# Patient Record
Sex: FEMALE | Race: WHITE | NOT HISPANIC OR LATINO | Employment: FULL TIME | URBAN - METROPOLITAN AREA
[De-identification: names, ages, dates, MRNs, and addresses within clinical notes are randomized per-mention and may not be internally consistent; named-entity substitution may affect disease eponyms.]

---

## 2018-09-25 ENCOUNTER — OFFICE VISIT (OUTPATIENT)
Dept: INTERNAL MEDICINE | Facility: CLINIC | Age: 28
End: 2018-09-25

## 2018-09-25 VITALS
DIASTOLIC BLOOD PRESSURE: 58 MMHG | RESPIRATION RATE: 16 BRPM | TEMPERATURE: 98.3 F | OXYGEN SATURATION: 98 % | WEIGHT: 137 LBS | HEART RATE: 58 BPM | HEIGHT: 67 IN | SYSTOLIC BLOOD PRESSURE: 114 MMHG | BODY MASS INDEX: 21.5 KG/M2

## 2018-09-25 DIAGNOSIS — J30.9 ALLERGIC RHINITIS, UNSPECIFIED CHRONICITY, UNSPECIFIED SEASONALITY, UNSPECIFIED TRIGGER: Primary | ICD-10-CM

## 2018-09-25 DIAGNOSIS — Z23 FLU VACCINE NEED: ICD-10-CM

## 2018-09-25 PROCEDURE — 90471 IMMUNIZATION ADMIN: CPT | Performed by: NURSE PRACTITIONER

## 2018-09-25 PROCEDURE — 99203 OFFICE O/P NEW LOW 30 MIN: CPT | Performed by: NURSE PRACTITIONER

## 2018-09-25 PROCEDURE — 90674 CCIIV4 VAC NO PRSV 0.5 ML IM: CPT | Performed by: NURSE PRACTITIONER

## 2018-09-25 RX ORDER — DESOGESTREL AND ETHINYL ESTRADIOL 0.15-0.03
1 KIT ORAL DAILY
COMMUNITY
End: 2021-08-03

## 2018-09-25 RX ORDER — FLUTICASONE PROPIONATE 50 MCG
2 SPRAY, SUSPENSION (ML) NASAL DAILY
Qty: 1 BOTTLE | Refills: 5 | Status: SHIPPED | OUTPATIENT
Start: 2018-09-25 | End: 2019-11-25 | Stop reason: SDUPTHER

## 2018-09-25 RX ORDER — LEVOCETIRIZINE DIHYDROCHLORIDE 5 MG/1
5 TABLET, FILM COATED ORAL EVERY EVENING
Qty: 30 TABLET | Refills: 5 | Status: SHIPPED | OUTPATIENT
Start: 2018-09-25 | End: 2019-03-28 | Stop reason: SDUPTHER

## 2018-09-25 NOTE — PROGRESS NOTES
Subjective   Danny Arita is a 28 y.o. female    Chief Complaint   Patient presents with   • Establish Care   • Allergies     nasal congestion, sneezing, watery eyes. This week feels much better. Using OTC Nasal spray and claritin     History of Present Illness     New pt here to establish care.      Allergies - pt states that for the past few weeks she has been battling congestion, sneezing, itchy watery eyes.  She has tried multiple OTC antihistamines and Afrin, along with her BF's singulair.  Sx's are waxing and waning.  Feeling better today than she has in a week.  No fever or chills.  Does not feel ill.    Past Medical History:   Diagnosis Date   • History of Papanicolaou smear of cervix 04/2018    GYN- Midwife at      History reviewed. No pertinent surgical history.    No Known Allergies    Family History   Problem Relation Age of Onset   • No Known Problems Mother    • No Known Problems Father    • Dementia Paternal Grandmother    • Cancer Paternal Grandfather    • No Known Problems Sister    • No Known Problems Brother    • No Known Problems Maternal Grandmother    • No Known Problems Maternal Grandfather    • Hashimoto's thyroiditis Sister      Social History     Social History   • Marital status: Significant Other     Spouse name: N/A   • Number of children: N/A   • Years of education: N/A     Occupational History   • Not on file.     Social History Main Topics   • Smoking status: Never Smoker   • Smokeless tobacco: Never Used   • Alcohol use Yes      Comment: 1-2 drinks a couple times per week   • Drug use: No   • Sexual activity: Yes     Partners: Male     Birth control/ protection: OCP      Comment: engaged     Other Topics Concern   • Not on file     Social History Narrative   • No narrative on file         The following portions of the patient's history were reviewed and updated as appropriate: allergies, current medications, past family history, past medical history, past social history, past  surgical history and problem list.    Current Outpatient Prescriptions:   •  desogestrel-ethinyl estradiol (APRI) 0.15-30 MG-MCG per tablet, Take 1 tablet by mouth Daily., Disp: , Rfl:   •  fluticasone (FLONASE) 50 MCG/ACT nasal spray, 2 sprays into the nostril(s) as directed by provider Daily., Disp: 1 bottle, Rfl: 5  •  levocetirizine (XYZAL) 5 MG tablet, Take 1 tablet by mouth Every Evening., Disp: 30 tablet, Rfl: 5     Review of Systems   Constitutional: Negative for chills, fatigue and fever.   HENT: Positive for congestion, postnasal drip, rhinorrhea and sneezing.    Eyes: Positive for itching.   Respiratory: Negative for cough, chest tightness and shortness of breath.    Cardiovascular: Negative for chest pain.   Gastrointestinal: Negative for abdominal pain, diarrhea, nausea and vomiting.   Endocrine: Negative for cold intolerance and heat intolerance.   Musculoskeletal: Negative for arthralgias.   Neurological: Negative for dizziness.       Objective   Physical Exam   Constitutional: She is oriented to person, place, and time. She appears well-developed and well-nourished.   HENT:   Head: Normocephalic and atraumatic.   Right Ear: A middle ear effusion is present.   Left Ear: A middle ear effusion is present.   Nose: Mucosal edema and rhinorrhea present. Right sinus exhibits no maxillary sinus tenderness and no frontal sinus tenderness. Left sinus exhibits no maxillary sinus tenderness and no frontal sinus tenderness.   Mouth/Throat: No posterior oropharyngeal erythema.   Eyes: Pupils are equal, round, and reactive to light. Conjunctivae and EOM are normal.   Neck: Normal range of motion.   Cardiovascular: Normal rate, regular rhythm and normal heart sounds.    Pulmonary/Chest: Effort normal and breath sounds normal.   Abdominal: Soft. Bowel sounds are normal.   Musculoskeletal: Normal range of motion.   Neurological: She is alert and oriented to person, place, and time. She has normal reflexes.   Skin:  "Skin is warm and dry.   Psychiatric: She has a normal mood and affect. Her behavior is normal. Judgment and thought content normal.     Vitals:    09/25/18 1500   BP: 114/58   Pulse: 58   Resp: 16   Temp: 98.3 °F (36.8 °C)   TempSrc: Temporal Artery    SpO2: 98%   Weight: 62.1 kg (137 lb)   Height: 169 cm (66.54\")         Assessment/Plan   Danny was seen today for establish care and allergies.    Diagnoses and all orders for this visit:    Allergic rhinitis, unspecified chronicity, unspecified seasonality, unspecified trigger  -     fluticasone (FLONASE) 50 MCG/ACT nasal spray; 2 sprays into the nostril(s) as directed by provider Daily.  -     levocetirizine (XYZAL) 5 MG tablet; Take 1 tablet by mouth Every Evening.    Flu vaccine need  -     Flucelvax Quad=>4Years (1231-0508)      Meds as directed  Flu shot updated  May need to add singulair if sx's are not improving  Return in about 6 weeks (around 11/6/2018) for Annual.             "

## 2019-03-28 DIAGNOSIS — J30.9 ALLERGIC RHINITIS: ICD-10-CM

## 2019-03-28 RX ORDER — LEVOCETIRIZINE DIHYDROCHLORIDE 5 MG/1
TABLET, FILM COATED ORAL
Qty: 30 TABLET | Refills: 2 | Status: SHIPPED | OUTPATIENT
Start: 2019-03-28 | End: 2019-04-25 | Stop reason: SDUPTHER

## 2019-04-25 DIAGNOSIS — J30.9 ALLERGIC RHINITIS: ICD-10-CM

## 2019-04-25 RX ORDER — LEVOCETIRIZINE DIHYDROCHLORIDE 5 MG/1
TABLET, FILM COATED ORAL
Qty: 30 TABLET | Refills: 2 | Status: SHIPPED | OUTPATIENT
Start: 2019-04-25 | End: 2019-11-25 | Stop reason: SDUPTHER

## 2019-11-25 ENCOUNTER — OFFICE VISIT (OUTPATIENT)
Dept: INTERNAL MEDICINE | Facility: CLINIC | Age: 29
End: 2019-11-25

## 2019-11-25 VITALS
HEART RATE: 64 BPM | RESPIRATION RATE: 16 BRPM | SYSTOLIC BLOOD PRESSURE: 104 MMHG | HEIGHT: 67 IN | WEIGHT: 137.6 LBS | DIASTOLIC BLOOD PRESSURE: 60 MMHG | OXYGEN SATURATION: 99 % | BODY MASS INDEX: 21.6 KG/M2 | TEMPERATURE: 98.4 F

## 2019-11-25 DIAGNOSIS — J30.9 ALLERGIC RHINITIS, UNSPECIFIED SEASONALITY, UNSPECIFIED TRIGGER: ICD-10-CM

## 2019-11-25 DIAGNOSIS — Z23 NEED FOR INFLUENZA VACCINATION: ICD-10-CM

## 2019-11-25 DIAGNOSIS — J30.9 ALLERGIC RHINITIS: ICD-10-CM

## 2019-11-25 DIAGNOSIS — R55 SYNCOPE, UNSPECIFIED SYNCOPE TYPE: Primary | ICD-10-CM

## 2019-11-25 DIAGNOSIS — I49.8 SINUS ARRHYTHMIA: ICD-10-CM

## 2019-11-25 PROCEDURE — 99214 OFFICE O/P EST MOD 30 MIN: CPT | Performed by: NURSE PRACTITIONER

## 2019-11-25 PROCEDURE — 85025 COMPLETE CBC W/AUTO DIFF WBC: CPT | Performed by: NURSE PRACTITIONER

## 2019-11-25 PROCEDURE — 90471 IMMUNIZATION ADMIN: CPT | Performed by: NURSE PRACTITIONER

## 2019-11-25 PROCEDURE — 90686 IIV4 VACC NO PRSV 0.5 ML IM: CPT | Performed by: NURSE PRACTITIONER

## 2019-11-25 PROCEDURE — 80053 COMPREHEN METABOLIC PANEL: CPT | Performed by: NURSE PRACTITIONER

## 2019-11-25 PROCEDURE — 84443 ASSAY THYROID STIM HORMONE: CPT | Performed by: NURSE PRACTITIONER

## 2019-11-25 RX ORDER — LEVOCETIRIZINE DIHYDROCHLORIDE 5 MG/1
5 TABLET, FILM COATED ORAL EVERY EVENING
Qty: 30 TABLET | Refills: 5 | Status: SHIPPED | OUTPATIENT
Start: 2019-11-25 | End: 2020-05-18

## 2019-11-25 RX ORDER — FLUTICASONE PROPIONATE 50 MCG
2 SPRAY, SUSPENSION (ML) NASAL DAILY
Qty: 1 BOTTLE | Refills: 5 | Status: SHIPPED | OUTPATIENT
Start: 2019-11-25 | End: 2020-05-18

## 2019-11-25 NOTE — PROGRESS NOTES
Subjective   Danny NORMAN is a 29 y.o. female    Chief Complaint   Patient presents with   • Follow-up   • Sleeping Problem     just falls a sleep very easily or either passing out. This has been an issue more frequently but has been something she has delt with for awhile.    • Flu Vaccine   • Allergic Rhinitis     refill on xyzal and flonase.      History of Present Illness     Syncope -patient states that she has had problems with passing out for many years now.  States that symptoms are becoming more frequent.  States that most recently she was at a bar, but had not had a drink yet when she was standing in a large group of people and fainted.  This happened earlier this summer when she was outside at a large family gathering and was very hot.  Both of the last 2 instances have been witnessed.  There have been no convulsions.  No loss of bowel or bladder.  Patient states that she does not feel post ictal after the syncopal episode.  She states that preceding the incident she does feel dizzy and lightheaded.  This is never been worked up.    Allergic rhinitis -Matias and stable on Xyzal 5 mg daily, and Flonase nasal spray.    Flu shot -updating today      The following portions of the patient's history were reviewed and updated as appropriate: allergies, current medications, past family history, past medical history, past social history, past surgical history and problem list.    Current Outpatient Medications:   •  desogestrel-ethinyl estradiol (APRI) 0.15-30 MG-MCG per tablet, Take 1 tablet by mouth Daily., Disp: , Rfl:   •  fluticasone (FLONASE) 50 MCG/ACT nasal spray, 2 sprays into the nostril(s) as directed by provider Daily., Disp: 1 bottle, Rfl: 5  •  levocetirizine (XYZAL) 5 MG tablet, Take 1 tablet by mouth Every Evening., Disp: 30 tablet, Rfl: 5     Review of Systems   Constitutional: Negative for chills, fatigue and fever.   Respiratory: Negative for cough, chest tightness and shortness of breath.   "  Cardiovascular: Negative for chest pain.   Gastrointestinal: Negative for abdominal pain, diarrhea, nausea and vomiting.   Endocrine: Negative for cold intolerance and heat intolerance.   Musculoskeletal: Negative for arthralgias.   Neurological: Positive for dizziness, syncope and light-headedness.       Objective     Physical Exam   Constitutional: She is oriented to person, place, and time. She appears well-developed and well-nourished.   HENT:   Head: Normocephalic and atraumatic.   Eyes: Conjunctivae and EOM are normal. Pupils are equal, round, and reactive to light.   Neck: Normal range of motion.   Cardiovascular: Normal rate, regular rhythm and normal heart sounds.   Pulmonary/Chest: Effort normal and breath sounds normal.   Abdominal: Soft. Bowel sounds are normal.   Musculoskeletal: Normal range of motion.   Neurological: She is alert and oriented to person, place, and time. She has normal strength and normal reflexes. A cranial nerve deficit and sensory deficit is present. She displays a negative Romberg sign. GCS eye subscore is 4. GCS verbal subscore is 5. GCS motor subscore is 6.   Skin: Skin is warm and dry.   Psychiatric: She has a normal mood and affect. Her behavior is normal. Judgment and thought content normal.     Vitals:    11/25/19 1621   BP: 104/60   Pulse: 64   Resp: 16   Temp: 98.4 °F (36.9 °C)   TempSrc: Temporal   SpO2: 99%   Weight: 62.4 kg (137 lb 9.6 oz)   Height: 169 cm (66.54\")     Statics  Lying- 98/60, heart rate 60, O2 sat 100%  Sitting -102/64, heart rate 64, O2 sat 99%  Lying-106/70, heart rate 77, O2 sat 100%      ECG 12 Lead  Date/Time: 11/26/2019 1:02 PM  Performed by: Charlotte Soto APRN  Authorized by: Charlotte Soto APRN   Comparison: not compared with previous ECG   Rhythm: sinus arrhythmia  Rate: normal  ST Segments: ST segments normal  T Waves: T waves normal  QRS axis: normal  Other findings: non-specific ST-T wave changes    Clinical impression: abnormal " EKG            Assessment/Plan   Danny was seen today for follow-up, sleeping problem, flu vaccine and allergic rhinitis.    Diagnoses and all orders for this visit:    Syncope, unspecified syncope type  -     ECG 12 Lead  -     CBC & Differential  -     Comprehensive Metabolic Panel  -     TSH  -     Holter monitor - 48 hour; Future  -     Adult Transthoracic Echo Complete W/ Cont if Necessary Per Protocol; Future  -     CBC Auto Differential    Sinus arrhythmia  -     Holter monitor - 48 hour; Future  -     Adult Transthoracic Echo Complete W/ Cont if Necessary Per Protocol; Future    Allergic rhinitis  -     levocetirizine (XYZAL) 5 MG tablet; Take 1 tablet by mouth Every Evening.    Allergic rhinitis, unspecified seasonality, unspecified trigger  -     fluticasone (FLONASE) 50 MCG/ACT nasal spray; 2 sprays into the nostril(s) as directed by provider Daily.    Need for influenza vaccination  -     Fluarix/Fluzone/Afluria Quad/FluLaval Quad    Orthostatics are within acceptable limits  EKG with sinus arrhythmia  We will check labs  We will set patient up for 48-hour Holter and echocardiogram  I have encouraged patient to increase water intake along with salt intake  Medications refilled  Flu shot updated  Follow-up with me in 4 to 6 weeks or sooner with any problems

## 2019-11-26 LAB
ALBUMIN SERPL-MCNC: 4.4 G/DL (ref 3.5–5.2)
ALBUMIN/GLOB SERPL: 1.6 G/DL
ALP SERPL-CCNC: 50 U/L (ref 39–117)
ALT SERPL W P-5'-P-CCNC: 15 U/L (ref 1–33)
ANION GAP SERPL CALCULATED.3IONS-SCNC: 13.1 MMOL/L (ref 5–15)
AST SERPL-CCNC: 18 U/L (ref 1–32)
BASOPHILS # BLD AUTO: 0.05 10*3/MM3 (ref 0–0.2)
BASOPHILS NFR BLD AUTO: 0.5 % (ref 0–1.5)
BILIRUB SERPL-MCNC: 0.2 MG/DL (ref 0.2–1.2)
BUN BLD-MCNC: 9 MG/DL (ref 6–20)
BUN/CREAT SERPL: 15.8 (ref 7–25)
CALCIUM SPEC-SCNC: 8.9 MG/DL (ref 8.6–10.5)
CHLORIDE SERPL-SCNC: 102 MMOL/L (ref 98–107)
CO2 SERPL-SCNC: 23.9 MMOL/L (ref 22–29)
CREAT BLD-MCNC: 0.57 MG/DL (ref 0.57–1)
DEPRECATED RDW RBC AUTO: 38.4 FL (ref 37–54)
EOSINOPHIL # BLD AUTO: 0.28 10*3/MM3 (ref 0–0.4)
EOSINOPHIL NFR BLD AUTO: 2.6 % (ref 0.3–6.2)
ERYTHROCYTE [DISTWIDTH] IN BLOOD BY AUTOMATED COUNT: 11.8 % (ref 12.3–15.4)
GFR SERPL CREATININE-BSD FRML MDRD: 125 ML/MIN/1.73
GLOBULIN UR ELPH-MCNC: 2.7 GM/DL
GLUCOSE BLD-MCNC: 77 MG/DL (ref 65–99)
HCT VFR BLD AUTO: 38.7 % (ref 34–46.6)
HGB BLD-MCNC: 13.1 G/DL (ref 12–15.9)
IMM GRANULOCYTES # BLD AUTO: 0.02 10*3/MM3 (ref 0–0.05)
IMM GRANULOCYTES NFR BLD AUTO: 0.2 % (ref 0–0.5)
LYMPHOCYTES # BLD AUTO: 3.18 10*3/MM3 (ref 0.7–3.1)
LYMPHOCYTES NFR BLD AUTO: 29.2 % (ref 19.6–45.3)
MCH RBC QN AUTO: 30.5 PG (ref 26.6–33)
MCHC RBC AUTO-ENTMCNC: 33.9 G/DL (ref 31.5–35.7)
MCV RBC AUTO: 90 FL (ref 79–97)
MONOCYTES # BLD AUTO: 0.61 10*3/MM3 (ref 0.1–0.9)
MONOCYTES NFR BLD AUTO: 5.6 % (ref 5–12)
NEUTROPHILS # BLD AUTO: 6.74 10*3/MM3 (ref 1.7–7)
NEUTROPHILS NFR BLD AUTO: 61.9 % (ref 42.7–76)
NRBC BLD AUTO-RTO: 0 /100 WBC (ref 0–0.2)
PLATELET # BLD AUTO: 393 10*3/MM3 (ref 140–450)
PMV BLD AUTO: 10.4 FL (ref 6–12)
POTASSIUM BLD-SCNC: 4.2 MMOL/L (ref 3.5–5.2)
PROT SERPL-MCNC: 7.1 G/DL (ref 6–8.5)
RBC # BLD AUTO: 4.3 10*6/MM3 (ref 3.77–5.28)
SODIUM BLD-SCNC: 139 MMOL/L (ref 136–145)
TSH SERPL DL<=0.05 MIU/L-ACNC: 5.19 UIU/ML (ref 0.27–4.2)
WBC NRBC COR # BLD: 10.88 10*3/MM3 (ref 3.4–10.8)

## 2019-11-26 PROCEDURE — 93000 ELECTROCARDIOGRAM COMPLETE: CPT | Performed by: NURSE PRACTITIONER

## 2019-12-02 DIAGNOSIS — R79.89 ABNORMAL TSH: Primary | ICD-10-CM

## 2019-12-04 ENCOUNTER — TELEPHONE (OUTPATIENT)
Dept: INTERNAL MEDICINE | Facility: CLINIC | Age: 29
End: 2019-12-04

## 2019-12-04 NOTE — TELEPHONE ENCOUNTER
----- Message from BLAINE Rangel sent at 12/2/2019 10:52 AM EST -----  Thyroid lab was slightly elevated.  I would like to check additional labs.  Orders are in the computer.  She can stop by anytime and have these done.  All other labs were within acceptable limits.

## 2019-12-10 ENCOUNTER — TELEPHONE (OUTPATIENT)
Dept: INTERNAL MEDICINE | Facility: CLINIC | Age: 29
End: 2019-12-10

## 2019-12-10 NOTE — TELEPHONE ENCOUNTER
Patient called back regarding her lab results. I read the message and gave her the information. Patient understood and will stop by the office to have additional labs.

## 2019-12-13 ENCOUNTER — LAB (OUTPATIENT)
Dept: INTERNAL MEDICINE | Facility: CLINIC | Age: 29
End: 2019-12-13

## 2019-12-13 ENCOUNTER — HOSPITAL ENCOUNTER (OUTPATIENT)
Dept: CARDIOLOGY | Facility: HOSPITAL | Age: 29
Discharge: HOME | End: 2019-12-13
Admitting: NURSE PRACTITIONER

## 2019-12-13 VITALS — WEIGHT: 137 LBS | HEIGHT: 66 IN | BODY MASS INDEX: 22.02 KG/M2

## 2019-12-13 DIAGNOSIS — R55 SYNCOPE, UNSPECIFIED SYNCOPE TYPE: ICD-10-CM

## 2019-12-13 DIAGNOSIS — I49.8 SINUS ARRHYTHMIA: ICD-10-CM

## 2019-12-13 DIAGNOSIS — R79.89 ABNORMAL TSH: ICD-10-CM

## 2019-12-13 LAB
25(OH)D3 SERPL-MCNC: 30.3 NG/ML (ref 30–100)
BH CV ECHO MEAS - AO MAX PG (FULL): 2.5 MMHG
BH CV ECHO MEAS - AO MAX PG: 6 MMHG
BH CV ECHO MEAS - AO MEAN PG (FULL): 2 MMHG
BH CV ECHO MEAS - AO MEAN PG: 4 MMHG
BH CV ECHO MEAS - AO V2 MAX: 122 CM/SEC
BH CV ECHO MEAS - AO V2 MEAN: 89.1 CM/SEC
BH CV ECHO MEAS - AO V2 VTI: 26.9 CM
BH CV ECHO MEAS - AVA(I,A): 2.5 CM^2
BH CV ECHO MEAS - AVA(I,D): 2.5 CM^2
BH CV ECHO MEAS - AVA(V,A): 2.6 CM^2
BH CV ECHO MEAS - AVA(V,D): 2.6 CM^2
BH CV ECHO MEAS - BSA(HAYCOCK): 1.7 M^2
BH CV ECHO MEAS - BSA: 1.7 M^2
BH CV ECHO MEAS - BZI_BMI: 22.1 KILOGRAMS/M^2
BH CV ECHO MEAS - BZI_METRIC_HEIGHT: 167.6 CM
BH CV ECHO MEAS - BZI_METRIC_WEIGHT: 62.1 KG
BH CV ECHO MEAS - EDV(CUBED): 99.3 ML
BH CV ECHO MEAS - EDV(TEICH): 98.8 ML
BH CV ECHO MEAS - EF(CUBED): 72.5 %
BH CV ECHO MEAS - EF(TEICH): 64.3 %
BH CV ECHO MEAS - ESV(CUBED): 27.3 ML
BH CV ECHO MEAS - ESV(TEICH): 35.3 ML
BH CV ECHO MEAS - FS: 35 %
BH CV ECHO MEAS - IVS/LVPW: 1
BH CV ECHO MEAS - IVSD: 0.79 CM
BH CV ECHO MEAS - LA DIMENSION: 2.5 CM
BH CV ECHO MEAS - LAD MAJOR: 3.6 CM
BH CV ECHO MEAS - LAT PEAK E' VEL: 28.6 CM/SEC
BH CV ECHO MEAS - LATERAL E/E' RATIO: 3.1
BH CV ECHO MEAS - LV MASS(C)D: 116.5 GRAMS
BH CV ECHO MEAS - LV MASS(C)DI: 68.4 GRAMS/M^2
BH CV ECHO MEAS - LV MAX PG: 3.5 MMHG
BH CV ECHO MEAS - LV MEAN PG: 2 MMHG
BH CV ECHO MEAS - LV V1 MAX: 93.2 CM/SEC
BH CV ECHO MEAS - LV V1 MEAN: 64.4 CM/SEC
BH CV ECHO MEAS - LV V1 VTI: 19.8 CM
BH CV ECHO MEAS - LVIDD: 4.6 CM
BH CV ECHO MEAS - LVIDS: 3 CM
BH CV ECHO MEAS - LVOT AREA (M): 3.5 CM^2
BH CV ECHO MEAS - LVOT AREA: 3.5 CM^2
BH CV ECHO MEAS - LVOT DIAM: 2.1 CM
BH CV ECHO MEAS - LVPWD: 0.78 CM
BH CV ECHO MEAS - MED PEAK E' VEL: 15.6 CM/SEC
BH CV ECHO MEAS - MEDIAL E/E' RATIO: 5.7
BH CV ECHO MEAS - MV A MAX VEL: 47.4 CM/SEC
BH CV ECHO MEAS - MV DEC TIME: 0.19 SEC
BH CV ECHO MEAS - MV E MAX VEL: 89.3 CM/SEC
BH CV ECHO MEAS - MV E/A: 1.9
BH CV ECHO MEAS - PA ACC SLOPE: 525 CM/SEC^2
BH CV ECHO MEAS - PA ACC TIME: 0.14 SEC
BH CV ECHO MEAS - PA MAX PG: 3.8 MMHG
BH CV ECHO MEAS - PA PR(ACCEL): 17.4 MMHG
BH CV ECHO MEAS - PA V2 MAX: 97.7 CM/SEC
BH CV ECHO MEAS - SI(CUBED): 42.3 ML/M^2
BH CV ECHO MEAS - SI(LVOT): 40.3 ML/M^2
BH CV ECHO MEAS - SI(TEICH): 37.3 ML/M^2
BH CV ECHO MEAS - SV(CUBED): 72 ML
BH CV ECHO MEAS - SV(LVOT): 68.6 ML
BH CV ECHO MEAS - SV(TEICH): 63.5 ML
BH CV ECHO MEAS - TAPSE (>1.6): 1.6 CM2
BH CV ECHO MEASUREMENTS AVERAGE E/E' RATIO: 4.04
BH CV VAS BP RIGHT ARM: NORMAL MMHG
BH CV XLRA - RV BASE: 2.9 CM
BH CV XLRA - RV LENGTH: 5.4 CM
BH CV XLRA - RV MID: 2.4 CM
BH CV XLRA - TDI S': 12.4 CM/SEC
LEFT ATRIUM VOLUME INDEX: 16.4 ML/M^2
LEFT ATRIUM VOLUME: 28 ML
LV EF 2D ECHO EST: 55 %
T3FREE SERPL-MCNC: 3.87 PG/ML (ref 2–4.4)
T4 FREE SERPL-MCNC: 1.21 NG/DL (ref 0.93–1.7)

## 2019-12-13 PROCEDURE — 84439 ASSAY OF FREE THYROXINE: CPT | Performed by: NURSE PRACTITIONER

## 2019-12-13 PROCEDURE — 82306 VITAMIN D 25 HYDROXY: CPT | Performed by: NURSE PRACTITIONER

## 2019-12-13 PROCEDURE — 84481 FREE ASSAY (FT-3): CPT | Performed by: NURSE PRACTITIONER

## 2019-12-13 PROCEDURE — 93306 TTE W/DOPPLER COMPLETE: CPT

## 2019-12-13 PROCEDURE — 93306 TTE W/DOPPLER COMPLETE: CPT | Performed by: INTERNAL MEDICINE

## 2019-12-17 ENCOUNTER — TELEPHONE (OUTPATIENT)
Dept: INTERNAL MEDICINE | Facility: CLINIC | Age: 29
End: 2019-12-17

## 2019-12-17 NOTE — TELEPHONE ENCOUNTER
Patient has been notified of results and verbalized understanding.     She said that when she went for the ECHO they told her that the insurance did not cover this test and she had to pay apart of full amount and wanted to know if this can be looked into or if it needed to coded a different way. I asked her if what she paid was just based on her deductible and she said that it wasn't a copay or deductible issue but simply the test was not covered. I told her we would look into that for her because normally we should have made sure it was approved before it was done?

## 2019-12-17 NOTE — TELEPHONE ENCOUNTER
----- Message from BLAINE Rangel sent at 12/17/2019  1:06 PM EST -----  Thyroid labs along with vitamin D look great.

## 2019-12-17 NOTE — TELEPHONE ENCOUNTER
----- Message from BLAINE Rangel sent at 12/17/2019  1:05 PM EST -----  Please let patient know that her echo shows a normal heart function.  She does have trace to mild mitral valve and tricuspid valve regurgitation.  This simply means that those valves do not close quite as tight as they should and do allow a mild amount of back flow.  This should not contribute to her symptoms and is nothing to be concerned about.  Nothing to do unless she were to have shortness of breath with swelling in lower extremities and/or chest pain.

## 2019-12-23 ENCOUNTER — TELEPHONE (OUTPATIENT)
Dept: INTERNAL MEDICINE | Facility: CLINIC | Age: 29
End: 2019-12-23

## 2019-12-23 NOTE — TELEPHONE ENCOUNTER
Left msg to call me back. Bozena had called patient on Friday and left ms for her to call back as well. I went ahead and sent patient a mychart msg letting her know that her Holter results were normal.

## 2020-01-06 ENCOUNTER — OFFICE VISIT (OUTPATIENT)
Dept: INTERNAL MEDICINE | Facility: CLINIC | Age: 30
End: 2020-01-06

## 2020-01-06 VITALS
HEART RATE: 51 BPM | HEIGHT: 67 IN | BODY MASS INDEX: 21.41 KG/M2 | WEIGHT: 136.4 LBS | RESPIRATION RATE: 16 BRPM | TEMPERATURE: 98.6 F | SYSTOLIC BLOOD PRESSURE: 98 MMHG | DIASTOLIC BLOOD PRESSURE: 70 MMHG | OXYGEN SATURATION: 99 %

## 2020-01-06 DIAGNOSIS — R55 VASOVAGAL SYNCOPE: Primary | ICD-10-CM

## 2020-01-06 PROCEDURE — 99213 OFFICE O/P EST LOW 20 MIN: CPT | Performed by: NURSE PRACTITIONER

## 2020-01-06 NOTE — PROGRESS NOTES
Subjective   Danny Hernandez is a 29 y.o. female    Chief Complaint   Patient presents with   • 6 week follow up   • Syncope     Had Echo and Holter done.      History of Present Illness     Here for 6 week f/u on syncope    Syncope -patient states that she has had problems with passing out for many years now.  At last visit she stated that she felt her symptoms were becoming more frequent.  States that most recently she was at a bar, but had not had a drink yet when she was standing in a large group of people and fainted.  This happened earlier this summer when she was outside at a large family gathering and was very hot.  Both of the last 2 instances have been witnessed.  There have been no convulsions.  No loss of bowel or bladder.  Patient states that she does not feel post ictal after the syncopal episode.  She states that preceding the incident she does feel dizzy and lightheaded.     At her last visit, we clive labs.  TSH was mildly elevated, but repeat TSH and T4 were WNL.  She has also worn a holter and had an echo - both WNL.  She has been increasing her hydration and salt intake since her last visit.  She has not had any further episodes.      The following portions of the patient's history were reviewed and updated as appropriate: allergies, current medications, past family history, past medical history, past social history, past surgical history and problem list.    Current Outpatient Medications:   •  desogestrel-ethinyl estradiol (APRI) 0.15-30 MG-MCG per tablet, Take 1 tablet by mouth Daily., Disp: , Rfl:   •  fluticasone (FLONASE) 50 MCG/ACT nasal spray, 2 sprays into the nostril(s) as directed by provider Daily., Disp: 1 bottle, Rfl: 5  •  levocetirizine (XYZAL) 5 MG tablet, Take 1 tablet by mouth Every Evening., Disp: 30 tablet, Rfl: 5     Review of Systems   Constitutional: Negative for chills, fatigue and fever.   Respiratory: Negative for cough, chest tightness and shortness of breath.   "  Cardiovascular: Negative for chest pain.   Gastrointestinal: Negative for abdominal pain, diarrhea, nausea and vomiting.   Endocrine: Negative for cold intolerance and heat intolerance.   Musculoskeletal: Negative for arthralgias.   Neurological: Negative for dizziness.       Objective   Physical Exam   Constitutional: She is oriented to person, place, and time. She appears well-developed and well-nourished.   HENT:   Head: Normocephalic and atraumatic.   Eyes: Pupils are equal, round, and reactive to light. Conjunctivae and EOM are normal.   Neck: Normal range of motion.   Cardiovascular: Normal rate, regular rhythm and normal heart sounds.   Pulmonary/Chest: Effort normal and breath sounds normal.   Abdominal: Soft. Bowel sounds are normal.   Musculoskeletal: Normal range of motion.   Neurological: She is alert and oriented to person, place, and time. She has normal reflexes.   Skin: Skin is warm and dry.   Psychiatric: She has a normal mood and affect. Her behavior is normal. Judgment and thought content normal.     Vitals:    01/06/20 1623   BP: 98/70   Pulse: 51   Resp: 16   Temp: 98.6 °F (37 °C)   TempSrc: Temporal   SpO2: 99%   Weight: 61.9 kg (136 lb 6.4 oz)   Height: 169 cm (66.54\")         Assessment/Plan   Danny was seen today for 6 week follow up and syncope.    Diagnoses and all orders for this visit:    Vasovagal syncope    I suggested the next step in the work up would be a tilt table, but she wishes to hold off on this for now  She will continue with increased hydration and salt intake  RTC if sx's worsen or do not improve             "

## 2020-02-24 ENCOUNTER — OFFICE VISIT (OUTPATIENT)
Dept: INTERNAL MEDICINE | Facility: CLINIC | Age: 30
End: 2020-02-24

## 2020-02-24 VITALS
RESPIRATION RATE: 16 BRPM | OXYGEN SATURATION: 98 % | DIASTOLIC BLOOD PRESSURE: 62 MMHG | SYSTOLIC BLOOD PRESSURE: 100 MMHG | HEART RATE: 89 BPM | TEMPERATURE: 98.6 F | BODY MASS INDEX: 21.06 KG/M2 | HEIGHT: 67 IN | WEIGHT: 134.2 LBS

## 2020-02-24 DIAGNOSIS — J10.1 INFLUENZA A: Primary | ICD-10-CM

## 2020-02-24 LAB
EXPIRATION DATE: ABNORMAL
FLUAV AG NPH QL: POSITIVE
FLUBV AG NPH QL: NEGATIVE
INTERNAL CONTROL: ABNORMAL
Lab: ABNORMAL

## 2020-02-24 PROCEDURE — 87804 INFLUENZA ASSAY W/OPTIC: CPT | Performed by: NURSE PRACTITIONER

## 2020-02-24 PROCEDURE — 99213 OFFICE O/P EST LOW 20 MIN: CPT | Performed by: NURSE PRACTITIONER

## 2020-02-24 RX ORDER — BROMPHENIRAMINE MALEATE, PSEUDOEPHEDRINE HYDROCHLORIDE, AND DEXTROMETHORPHAN HYDROBROMIDE 2; 30; 10 MG/5ML; MG/5ML; MG/5ML
10 SYRUP ORAL 3 TIMES DAILY PRN
Qty: 300 ML | Refills: 0 | Status: SHIPPED | OUTPATIENT
Start: 2020-02-24 | End: 2021-08-03

## 2020-02-24 RX ORDER — OSELTAMIVIR PHOSPHATE 75 MG/1
75 CAPSULE ORAL 2 TIMES DAILY
Qty: 10 CAPSULE | Refills: 0 | Status: SHIPPED | OUTPATIENT
Start: 2020-02-24 | End: 2020-02-29

## 2020-02-24 NOTE — PROGRESS NOTES
Subjective   Danny Hernandez is a 29 y.o. female    Chief Complaint   Patient presents with   • URI     sx's started about 2 weeks. Nasal congestion, Productive cough, drainage, runny nose, sneezing, neck and upper back pain     History of Present Illness     Pt states that she began with URI sx's approximately 2 weeks ago.  Sx's were waxing and waning until she began to feel acutely ill/worse 2 days ago.  Since Saturday she has been extremely tired with body aches, cough, congestion and upper back pain.  She was recently exposed to the flu and her sx's are similar to what she experienced the last time she had the flu.  She denies fever, but has felt chilled.      The following portions of the patient's history were reviewed and updated as appropriate: allergies, current medications, past family history, past medical history, past social history, past surgical history and problem list.    Current Outpatient Medications:   •  brompheniramine-pseudoephedrine-DM 30-2-10 MG/5ML syrup, Take 10 mL by mouth 3 (Three) Times a Day As Needed for Congestion or Cough., Disp: 300 mL, Rfl: 0  •  desogestrel-ethinyl estradiol (APRI) 0.15-30 MG-MCG per tablet, Take 1 tablet by mouth Daily., Disp: , Rfl:   •  fluticasone (FLONASE) 50 MCG/ACT nasal spray, 2 sprays into the nostril(s) as directed by provider Daily., Disp: 1 bottle, Rfl: 5  •  levocetirizine (XYZAL) 5 MG tablet, Take 1 tablet by mouth Every Evening., Disp: 30 tablet, Rfl: 5  •  oseltamivir (TAMIFLU) 75 MG capsule, Take 1 capsule by mouth 2 (Two) Times a Day for 5 days., Disp: 10 capsule, Rfl: 0     Review of Systems   Constitutional: Positive for chills and fatigue. Negative for fever.   HENT: Positive for congestion, postnasal drip, rhinorrhea and sore throat.    Respiratory: Positive for cough. Negative for chest tightness and shortness of breath.    Cardiovascular: Negative for chest pain.   Gastrointestinal: Negative for abdominal pain, diarrhea, nausea and vomiting.  "  Endocrine: Negative for cold intolerance and heat intolerance.   Musculoskeletal: Negative for arthralgias.   Neurological: Negative for dizziness.       Objective   Physical Exam   Constitutional: She is oriented to person, place, and time. She appears well-developed and well-nourished.   HENT:   Head: Normocephalic and atraumatic.   Eyes: Pupils are equal, round, and reactive to light. Conjunctivae and EOM are normal.   Neck: Normal range of motion.   Cardiovascular: Normal rate, regular rhythm and normal heart sounds.   Pulmonary/Chest: Effort normal and breath sounds normal.   Abdominal: Soft. Bowel sounds are normal.   Musculoskeletal: Normal range of motion.   Neurological: She is alert and oriented to person, place, and time. She has normal reflexes.   Skin: Skin is warm and dry.   Psychiatric: She has a normal mood and affect. Her behavior is normal. Judgment and thought content normal.     Vitals:    02/24/20 1705   BP: 100/62   Pulse: 89   Resp: 16   Temp: 98.6 °F (37 °C)   TempSrc: Temporal   SpO2: 98%   Weight: 60.9 kg (134 lb 3.2 oz)   Height: 169 cm (66.54\")         Assessment/Plan   Danny was seen today for uri.    Diagnoses and all orders for this visit:    Influenza A  -     POC Influenza A / B  -     oseltamivir (TAMIFLU) 75 MG capsule; Take 1 capsule by mouth 2 (Two) Times a Day for 5 days.  -     brompheniramine-pseudoephedrine-DM 30-2-10 MG/5ML syrup; Take 10 mL by mouth 3 (Three) Times a Day As Needed for Congestion or Cough.      Rapid flu was + for Flu A  Tamilfu as directed since it appears her flu - like sx's began just 2 days ago  Bromfed PRN for cough/congestion  Increase fluids  May alternate tylenol and ibuprofen PRN  RTC if sx's worsen or do not improve           "

## 2020-05-18 DIAGNOSIS — J30.9 ALLERGIC RHINITIS, UNSPECIFIED SEASONALITY, UNSPECIFIED TRIGGER: ICD-10-CM

## 2020-05-18 DIAGNOSIS — J30.9 ALLERGIC RHINITIS: ICD-10-CM

## 2020-05-18 RX ORDER — LEVOCETIRIZINE DIHYDROCHLORIDE 5 MG/1
TABLET, FILM COATED ORAL
Qty: 90 TABLET | Refills: 1 | Status: SHIPPED | OUTPATIENT
Start: 2020-05-18 | End: 2021-01-04

## 2020-05-18 RX ORDER — FLUTICASONE PROPIONATE 50 MCG
2 SPRAY, SUSPENSION (ML) NASAL DAILY
Qty: 48 ML | Refills: 1 | Status: SHIPPED | OUTPATIENT
Start: 2020-05-18 | End: 2020-12-07

## 2020-12-06 DIAGNOSIS — J30.9 ALLERGIC RHINITIS, UNSPECIFIED SEASONALITY, UNSPECIFIED TRIGGER: ICD-10-CM

## 2020-12-07 RX ORDER — FLUTICASONE PROPIONATE 50 MCG
SPRAY, SUSPENSION (ML) NASAL
Qty: 16 ML | Refills: 2 | Status: SHIPPED | OUTPATIENT
Start: 2020-12-07 | End: 2021-03-21

## 2021-01-02 DIAGNOSIS — J30.9 ALLERGIC RHINITIS: ICD-10-CM

## 2021-01-04 RX ORDER — LEVOCETIRIZINE DIHYDROCHLORIDE 5 MG/1
TABLET, FILM COATED ORAL
Qty: 30 TABLET | Refills: 2 | Status: SHIPPED | OUTPATIENT
Start: 2021-01-04 | End: 2022-12-15 | Stop reason: SDUPTHER

## 2021-03-20 DIAGNOSIS — J30.9 ALLERGIC RHINITIS, UNSPECIFIED SEASONALITY, UNSPECIFIED TRIGGER: ICD-10-CM

## 2021-03-21 RX ORDER — FLUTICASONE PROPIONATE 50 MCG
SPRAY, SUSPENSION (ML) NASAL
Qty: 16 ML | Refills: 2 | Status: SHIPPED | OUTPATIENT
Start: 2021-03-21 | End: 2021-07-08

## 2021-07-07 DIAGNOSIS — J30.9 ALLERGIC RHINITIS, UNSPECIFIED SEASONALITY, UNSPECIFIED TRIGGER: ICD-10-CM

## 2021-07-07 NOTE — TELEPHONE ENCOUNTER
Last Office Visit: 02/24/2020  Next Office Visit: none     Labs completed in past 6 months? no  Labs completed in past year? no    Last Refill Date: 03/21/2021  Quantity: 16 mL  Refills: 2     Pharmacy: [facility]/pharmacy #[id number] - [place], [place] - [address] AT [address] - [phone]  - [phone]    [address] [address]   Phone:  [phone]  Fax:  [phone]    Please review pended refill request for any changes needed on refills or quantities. Thank you!

## 2021-07-08 RX ORDER — FLUTICASONE PROPIONATE 50 MCG
SPRAY, SUSPENSION (ML) NASAL
Qty: 16 ML | Refills: 2 | Status: SHIPPED | OUTPATIENT
Start: 2021-07-08 | End: 2022-12-15

## 2022-12-15 ENCOUNTER — OFFICE VISIT (OUTPATIENT)
Dept: INTERNAL MEDICINE | Facility: CLINIC | Age: 32
End: 2022-12-15

## 2022-12-15 VITALS
SYSTOLIC BLOOD PRESSURE: 106 MMHG | RESPIRATION RATE: 18 BRPM | DIASTOLIC BLOOD PRESSURE: 60 MMHG | BODY MASS INDEX: 21.75 KG/M2 | WEIGHT: 138.6 LBS | OXYGEN SATURATION: 98 % | HEIGHT: 67 IN | TEMPERATURE: 97.1 F | HEART RATE: 84 BPM

## 2022-12-15 DIAGNOSIS — Z00.00 HEALTHCARE MAINTENANCE: Primary | ICD-10-CM

## 2022-12-15 DIAGNOSIS — B36.0 TINEA VERSICOLOR: ICD-10-CM

## 2022-12-15 DIAGNOSIS — J30.2 SEASONAL ALLERGIC RHINITIS, UNSPECIFIED TRIGGER: ICD-10-CM

## 2022-12-15 DIAGNOSIS — Z23 ENCOUNTER FOR VACCINATION: ICD-10-CM

## 2022-12-15 PROBLEM — J30.89 NON-SEASONAL ALLERGIC RHINITIS: Status: ACTIVE | Noted: 2022-12-15

## 2022-12-15 PROCEDURE — 91312 COVID-19 (PFIZER) BIVALENT BOOSTER 12+YRS: CPT | Performed by: STUDENT IN AN ORGANIZED HEALTH CARE EDUCATION/TRAINING PROGRAM

## 2022-12-15 PROCEDURE — 0124A PR ADM SARSCOV2 30MCG/0.3ML BST: CPT | Performed by: STUDENT IN AN ORGANIZED HEALTH CARE EDUCATION/TRAINING PROGRAM

## 2022-12-15 PROCEDURE — 99395 PREV VISIT EST AGE 18-39: CPT | Performed by: STUDENT IN AN ORGANIZED HEALTH CARE EDUCATION/TRAINING PROGRAM

## 2022-12-15 PROCEDURE — 99214 OFFICE O/P EST MOD 30 MIN: CPT | Performed by: STUDENT IN AN ORGANIZED HEALTH CARE EDUCATION/TRAINING PROGRAM

## 2022-12-15 PROCEDURE — 90686 IIV4 VACC NO PRSV 0.5 ML IM: CPT | Performed by: STUDENT IN AN ORGANIZED HEALTH CARE EDUCATION/TRAINING PROGRAM

## 2022-12-15 PROCEDURE — 90471 IMMUNIZATION ADMIN: CPT | Performed by: STUDENT IN AN ORGANIZED HEALTH CARE EDUCATION/TRAINING PROGRAM

## 2022-12-15 RX ORDER — CLOTRIMAZOLE AND BETAMETHASONE DIPROPIONATE 10; .64 MG/G; MG/G
1 CREAM TOPICAL 2 TIMES DAILY
Qty: 45 G | Refills: 0 | Status: SHIPPED | OUTPATIENT
Start: 2022-12-15

## 2022-12-15 RX ORDER — LEVOCETIRIZINE DIHYDROCHLORIDE 5 MG/1
5 TABLET, FILM COATED ORAL EVERY EVENING
Qty: 90 TABLET | Refills: 3 | Status: SHIPPED | OUTPATIENT
Start: 2022-12-15

## 2022-12-15 NOTE — ASSESSMENT & PLAN NOTE
- Discussed artificial intelligence rash reader.  - Discussed treatment options.  - Patient will start Lotrisone.  - Advised following up with dermatology.

## 2022-12-15 NOTE — ASSESSMENT & PLAN NOTE
- Reviewed healthcare screenings.  - Discussed national exercise guidelines.  - Discussed contraception options.  - Advised to follow-up in 1 year and to contact me if anything comes up before then.

## 2022-12-15 NOTE — PROGRESS NOTES
Female Physical Note      Date: 12/15/2022   Patient Name: Danny Hernandez  : 1990   MRN: [id number]     Chief Complaint:    Chief Complaint   Patient presents with   • Spots and/or Floaters     back   • Allergies     Seasonal  Establish care       History of Present Illness: Danny eHrnandez is a 32 y.o. female who is here today for their annual health maintenance and physical.    Medical History  The patient states she had a baby 20 months ago. She denies a family history of colon cancer. She reports her brother had glioblastoma multiforme cancer. She reports her grandfather had pancreatic cancer. The patient reports her last Pap smear was during her pregnancy sometime before 2021. She reports a history of an abnormal Pap smear in 2017, but states they tested frequently after, which all came back fine. She [facility] The patient reports she goes to [facility]. She denies seeing an ophthalmologist. She states her diet is good. She reports she does not exercise as often as she used to because she does not have the time. 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 She reports she is having a gym set up in her garage. The patient states her menstrual cycles were regular before she was pregnant because she was on the pill. She reports she has received the Medroxy injection every 3 months for the past 2 years, and she currently does not have her menstrual cycle. She reports she would like to switch back to the pill for contraception. The patient denies feeling down, depressed or hopeless or like she has lost interest in things. She denies feeling unsafe at home or like she has been the victim of any abuse.    Seasonal Allergies  The patient states she used to see an APRN 4 years ago who prescribed her levetiracetam and Flonase for her seasonal allergies, which helped keep them under control. She reports she buys her allergy medicine over  the counter now and would like to have a prescription for them.    Skin Spots on Back  The patient states her spots only appear in the summer. She reports using Selsun blue, which helped it go away for a couple years before flaring up again in 07/2022. She reports trying Selsun blue again, which did not work. She describes that the spots are white. The patient reports they itch occasionally. She notes she bought an antifungal Tinea Treatment online, but it did not help. She reports she has an appointment with a dermatologist at [facility] in 01/2023. She states she did have a spot that was bothering her, but it has improved. The patient reports having matching moles. She reports having a bug bite that was really irritated for a couple months, but it has since resolved.    Immunizations  The patient states she has not received her COVID-19 and influenza vaccines this year and that she would like to receive them today.    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       Subjective      Review of Systems:   Review of Systems   Constitutional: Negative for activity change, appetite change, fatigue and fever.   HENT: Positive for congestion.    Eyes: Negative for blurred vision, photophobia and visual disturbance.   Respiratory: Negative for cough, chest tightness and shortness of breath.    Cardiovascular: Negative for chest pain and palpitations.   Gastrointestinal: Negative for abdominal distention, abdominal pain, blood in stool, constipation, diarrhea, nausea and vomiting.   Genitourinary: Negative for dysuria and hematuria.   Musculoskeletal: Negative for arthralgias, back pain and joint swelling.   Skin: Positive for rash. Negative for wound.   Neurological: Negative for weakness, headache and confusion.       Past Medical History, Social History, Family History and Care Team were all reviewed with patient and updated as appropriate.     Medications:     Current  Outpatient Medications:   •  levocetirizine (XYZAL) 5 MG tablet, Take 1 tablet by mouth Every Evening., Disp: 90 tablet, Rfl: 3  •  medroxyPROGESTERone (DEPO-PROVERA) 150 MG/ML injection, Inject 150 mg into the appropriate muscle as directed by prescriber Every 3 (Three) Months., Disp: , Rfl:   •  budesonide (RINOCORT AQUA) 32 MCG/ACT nasal spray, 2 sprays into the nostril(s) as directed by provider Daily., Disp: 8.43 mL, Rfl: 3  •  clotrimazole-betamethasone (Lotrisone) 1-0.05 % cream, Apply 1 application topically to the appropriate area as directed 2 (Two) Times a Day., Disp: 45 g, Rfl: 0    Allergies:   No Known Allergies    Immunizations:  Immunization History   Administered Date(s) Administered   • COVID-19 (PFIZER) BIVALENT BOOSTER 12+YRS 12/15/2022   • COVID-19 (PFIZER) PURPLE CAP 03/17/2021, 04/10/2021   • Flu Vaccine Split Quad 10/06/2020   • FluLaval/Fluzone >6mos 11/25/2019, 12/15/2022   • Hepatitis A 07/09/2019   • Tdap 01/29/2021   • flucelvax quad pfs =>4 YRS 09/25/2018       Colorectal Screening:   Up-To-Date   Last Completed Colonoscopy     This patient has no relevant Health Maintenance data.        Pap:  Up-To-Date   Last Completed Pap Smear          PAP SMEAR (Every 3 Years) Next due on 3/15/2024    03/15/2021  Patient-Reported (Performed Externally)    04/01/2018  Patient-Reported (Performed Externally)               Mammogram:  Up-To-Date   Last Completed Mammogram     This patient has no relevant Health Maintenance data.           CT for Smoker (Age 50-80, 20 pk yr):  N/A  Bone Density/DEXA (Age 65 or high risk): DEXA scan to be completed at age 65  Hep C (Age 18-79 once):  previously negative  HIV (Age 15-65 once): previously negative  A1c: ordered  Lipid panel: ordered      Dermatology: establish  Ophthalmologist: plans to establish  Dentist: established    Tobacco Use: Low Risk    • Smoking Tobacco Use: Never   • Smokeless Tobacco Use: Never   • Passive Exposure: Not on file       Social  "History     Substance and Sexual Activity   Alcohol Use Yes    Comment: 1-2 drinks a couple times per week        Social History     Substance and Sexual Activity   Drug Use No        Diet/Physical activity: counseled on 12/16/22      Sexual Health: using contraception, not attempting pregnancy   Menopause: pre-menopausal  Menstrual Cycles: regular, last menstrual cycle: unknown    PHQ-2 Depression Screening  Little interest or pleasure in doing things? 0-->not at all   Feeling down, depressed, or hopeless? 0-->not at all   PHQ-2 Total Score 0     PHQ-9 Total Score: 0     Intimate partner violence: (Screen on initial visit, pregnant women, women with injuries, older adult with injury or evidence of neglect): no concerns  • Violence can be a problem in many people's lives, so I now ask every patient about trauma or abuse they may have experienced in a relationship.  • Stress/Safety - Do you feel safe in your relationship?  • Afraid/Abused - Have you ever been in a relationship where you were threatened, hurt, or afraid?  • Friend/Family - Are your friends aware you have been hurt?  • Emergency Plan - Do you have a safe place to go and the resources you need in an emergency?    Osteoporosis: no concerns  • Ost menopausal women < 65 with RF (advancing age, previous fracture, glucocorticoid therapy, parental hip fracture, low body weight, current cigarette smoking, excessive alcohol consumption, rheumatoid arthritis, secondary osteoporosis [hypogonadism/premature menopause, malabsorption, chronic liver disease, IBD]).  • All women 65 or older    Objective     Physical Exam:  Vital Signs:   Vitals:    12/15/22 0859   BP: 106/60   BP Location: Right arm   Patient Position: Sitting   Cuff Size: Adult   Pulse: 84   Resp: 18   Temp: 97.1 °F (36.2 °C)   TempSrc: Temporal   SpO2: 98%   Weight: 62.9 kg (138 lb 9.6 oz)   Height: 169 cm (66.54\")   PainSc: 0-No pain     Body mass index is 22.01 kg/m².     Physical Exam  Vitals and " nursing note reviewed.   Constitutional:       General: She is not in acute distress.     Appearance: Normal appearance. She is normal weight. She is not ill-appearing or toxic-appearing.   HENT:      Nose: No congestion or rhinorrhea.   Eyes:      General:         Right eye: No discharge.         Left eye: No discharge.      Conjunctiva/sclera: Conjunctivae normal.   Cardiovascular:      Rate and Rhythm: Normal rate and regular rhythm.      Heart sounds: Normal heart sounds. No murmur heard.    No friction rub.   Pulmonary:      Effort: Pulmonary effort is normal. No respiratory distress.      Breath sounds: Normal breath sounds. No wheezing or rhonchi.   Abdominal:      General: Abdomen is flat. Bowel sounds are normal. There is no distension.      Palpations: Abdomen is soft. There is no mass.      Tenderness: There is no abdominal tenderness. There is no guarding or rebound.   Musculoskeletal:      Cervical back: Normal range of motion.      Right lower leg: No edema.      Left lower leg: No edema.   Skin:     Findings: Rash (see below) present. No lesion.   Neurological:      General: No focal deficit present.      Mental Status: She is alert. Mental status is at baseline.      Coordination: Coordination normal.      Gait: Gait normal.   Psychiatric:         Mood and Affect: Mood normal.         Behavior: Behavior normal.         Thought Content: Thought content normal.         Judgment: Judgment normal.         Procedures    Assessment / Plan      Assessment/Plan:   Problem List Items Addressed This Visit        Allergies and Adverse Reactions    Seasonal allergic rhinitis    Current Assessment & Plan     - Patient will start Rhinocort.  - Discussed how to administer Rhinocort.         Relevant Medications    levocetirizine (XYZAL) 5 MG tablet    budesonide (RINOCORT AQUA) 32 MCG/ACT nasal spray       Health Encounters    Healthcare maintenance - Primary    Current Assessment & Plan     - Reviewed healthcare  screenings.  - Discussed national exercise guidelines.  - Discussed contraception options.  - Advised to follow-up in 1 year and to contact me if anything comes up before then.            Skin    Tinea versicolor    Current Assessment & Plan           - Discussed artificial intelligence rash reader.  - Discussed treatment options.  - Patient will start Lotrisone.  - Advised following up with dermatology.         Relevant Medications    clotrimazole-betamethasone (Lotrisone) 1-0.05 % cream       Other    Encounter for vaccination    Current Assessment & Plan     - Patient will receive her COVID-19 vaccination.  - Patient will receive her influenza vaccination.          The ASCVD Risk score (Brittany OSBORNE, et al., 2019) failed to calculate for the following reasons:    The 2019 ASCVD risk score is only valid for ages 40 to 79    Healthcare Maintenance:  Counseling provided based on age appropriate USPSTF guidelines.  BMI is within normal parameters. No other follow-up for BMI required.    Danny Hernandez voices understanding and acceptance of this advice and will call back with any further questions or concerns. AVS with preventive healthcare tips printed for patient.     “Discussed risks/benefits to vaccination, reviewed components of the vaccine, discussed VIS, discussed informed consent, informed consent obtained. Patient/Parent was allowed to accept or refuse vaccine. Questions answered to satisfactory state of patient/Parent. We reviewed typical age appropriate and seasonally appropriate vaccinations. Reviewed immunization history and updated state vaccination form as needed. Patient was counseled on COVID-19 Bivalent  Influenza      Follow Up:   Return in about 1 year (around 12/15/2023) for Annual.          Terrance Paniagua MD  [facility]    Transcribed from ambient dictation for Terrance Paniagua MD by Taylor Humes.  12/15/22   11:49 EST    Patient or patient representative verbalized consent to the visit  recording.  I have personally performed the services described in this document as transcribed by the above individual, and it is both accurate and complete.

## 2022-12-15 NOTE — ASSESSMENT & PLAN NOTE
- Patient will receive her COVID-19 vaccination.  - Patient will receive her influenza vaccination.

## 2023-08-12 DIAGNOSIS — J30.2 SEASONAL ALLERGIC RHINITIS, UNSPECIFIED TRIGGER: ICD-10-CM

## 2023-08-14 RX ORDER — BUDESONIDE 32 UG/1
SPRAY, METERED NASAL
Qty: 8.43 EACH | Refills: 3 | Status: SHIPPED | OUTPATIENT
Start: 2023-08-14

## 2023-10-12 ENCOUNTER — OFFICE VISIT (OUTPATIENT)
Dept: INTERNAL MEDICINE | Facility: CLINIC | Age: 33
End: 2023-10-12
Payer: COMMERCIAL

## 2023-10-12 VITALS
DIASTOLIC BLOOD PRESSURE: 64 MMHG | SYSTOLIC BLOOD PRESSURE: 122 MMHG | RESPIRATION RATE: 12 BRPM | TEMPERATURE: 97.3 F | HEART RATE: 84 BPM | WEIGHT: 140 LBS | BODY MASS INDEX: 22.23 KG/M2

## 2023-10-12 DIAGNOSIS — J06.9 UPPER RESPIRATORY VIRUS: Primary | ICD-10-CM

## 2023-10-12 DIAGNOSIS — R05.9 COUGH, UNSPECIFIED TYPE: ICD-10-CM

## 2023-10-12 PROCEDURE — 87880 STREP A ASSAY W/OPTIC: CPT | Performed by: NURSE PRACTITIONER

## 2023-10-12 PROCEDURE — 87428 SARSCOV & INF VIR A&B AG IA: CPT | Performed by: NURSE PRACTITIONER

## 2023-10-12 PROCEDURE — 99213 OFFICE O/P EST LOW 20 MIN: CPT | Performed by: NURSE PRACTITIONER

## 2023-10-12 RX ORDER — DESOGESTREL AND ETHINYL ESTRADIOL 0.15-0.03
1 KIT ORAL DAILY
COMMUNITY

## 2023-10-12 NOTE — PROGRESS NOTES
"  New Patient Office Visit      Patient Name: Danny Hernandez  : 1990   MRN: [id number]     Chief Complaint:    Chief Complaint   Patient presents with    Generalized Body Aches    Sore Throat    Chills    Fatigue    Headache       History of Present Illness: Danny Hernandez is a 33 y.o. female presents complaints of generalized body aches, sore throat, chills, fatigue, and headache.    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 She reports that it does not feel like her normal seasonal allergy drainage. She states that she experienced a sore throat on yesterday, 10/11/2023. She notes that she was exhausted and went to bed at about 8:00 PM. She denies any cough or fever. She reports that her throat is really raw and \"foggy\". She denies any diarrhea. She states that she took some cold medication last night that helps her sleep.    She reports today she had body aches in her legs and back so she left work early.     Subjective     Review of System: Review of Systems   Constitutional:  Positive for chills and fatigue.   HENT:  Positive for sore throat.    Eyes: Negative.    Respiratory: Negative.     Cardiovascular: Negative.    Gastrointestinal: Negative.    Endocrine: Negative.    Genitourinary: Negative.    Musculoskeletal: Negative.    Skin: Negative.    Allergic/Immunologic: Negative.    Neurological:  Positive for headaches.   Hematological: Negative.    Psychiatric/Behavioral: Negative.        I have reviewed the ROS documented by my clinical staff, updated appropriately and I agree. BLAINE Hicks     Past Medical History:   Past Medical History:   Diagnosis Date    History of Papanicolaou smear of cervix 2018    GYN- Midwife at        Past Surgical History:   Past Surgical History:   Procedure Laterality Date    NO PAST SURGERIES         Family History:   Family History   Problem Relation Age of Onset    No Known Problems Mother  "    No Known Problems Father     Asthma Sister     Thyroid disease Sister     Hashimoto's thyroiditis Sister     Brain cancer Brother     Cancer Brother     No Known Problems Maternal Grandmother     No Known Problems Maternal Grandfather     Dementia Paternal Grandmother     Pancreatic cancer Paternal Grandfather        Social History:   Social History     Socioeconomic History    Marital status:    Tobacco Use    Smoking status: Never    Smokeless tobacco: Never   Vaping Use    Vaping Use: Never used   Substance and Sexual Activity    Alcohol use: Yes     Alcohol/week: 6.0 standard drinks of alcohol     Types: 6 Glasses of wine per week     Comment: 1-2 drinks a couple times per week    Drug use: No    Sexual activity: Yes     Partners: Male     Birth control/protection: Birth control pill, OCP     Comment: engaged       Medications:     Current Outpatient Medications:     Apri 0.15-30 MG-MCG per tablet, Take 1 tablet by mouth Daily., Disp: , Rfl:     clotrimazole-betamethasone (Lotrisone) 1-0.05 % cream, Apply 1 application topically to the appropriate area as directed 2 (Two) Times a Day., Disp: 45 g, Rfl: 0    [facility] Budesonide 32 MCG/ACT nasal spray, SPRAY 2 SPRAYS INTO THE NOSTRIL AS DIRECTED BY PROVIDER DAILY., Disp: 8.43 each, Rfl: 3    levocetirizine (XYZAL) 5 MG tablet, Take 1 tablet by mouth Every Evening., Disp: 90 tablet, Rfl: 3    Allergies:   No Known Allergies    Objective     Physical Exam:   Vital Signs:   Vitals:    10/12/23 1535   BP: 122/64   BP Location: Right arm   Patient Position: Sitting   Cuff Size: Adult   Pulse: 84   Resp: 12   Temp: 97.3 øF (36.3 øC)   TempSrc: Infrared   Weight: 63.5 kg (140 lb)     Body mass index is 22.23 kg/mý. BMI is within normal parameters. No other follow-up for BMI required.      Physical Exam  Constitutional:       Appearance: She is not ill-appearing.   HENT:      Right Ear: Tympanic membrane normal.      Left Ear: Tympanic membrane normal.      Nose:  Congestion and rhinorrhea present.      Mouth/Throat:      Pharynx: Posterior oropharyngeal erythema present.   Cardiovascular:      Rate and Rhythm: Normal rate and regular rhythm.      Heart sounds: Normal heart sounds. No murmur heard.  Pulmonary:      Effort: No respiratory distress.      Breath sounds: Normal breath sounds. No stridor. No wheezing, rhonchi or rales.   Abdominal:      General: Bowel sounds are normal.      Tenderness: There is no abdominal tenderness.   Lymphadenopathy:      Cervical: Cervical adenopathy present.   Skin:     General: Skin is warm and dry.     Assessment / Plan      Assessment/Plan:   Diagnoses and all orders for this visit:    1. Upper respiratory virus (Primary)    2. Cough, unspecified type  -     POC Rapid Strep A  -     Covid-19 + Flu A&B AG, Veritor       1. Cold  -Strip, COVID and flu were negative.  The patient complains of generalized body aches, sore throat, chills, fatigue, and a headache. She started experiencing these symptoms on yesterday, 10/11/2023 and took some cold medicine last night before bed.  -Continue supportive care.  She has been advised to drink plenty of fluids and to rest.  Recommend over-the-counter medications for symptomatic relief as well as Tylenol or Advil for body aches/fever    I explained and discussed patient's condition and plan of care.  Discussed when to follow-up.  Discussed possible red flags and how to follow-up with those.  Viewed patient's medications and discussed common side effects. Patient to continue current medications as advised.  Be compliant with medications. Patient to let me know if she has any changes in health, does not tolerate medication, or any future concerns about treatment. Patient verbalized understanding and agreement with plan of care.     Follow Up:   Follow-up if no improvement.    BLAINE Hicks  [facility]    Transcribed from ambient dictation for Beverly  Julien APRN by Briana Duval.  10/12/23   16:39 EDT    Patient or patient representative verbalized consent to the visit recording.  I have personally performed the services described in this document as transcribed by the above individual, and it is both accurate and complete.  c

## 2023-10-13 LAB
EXPIRATION DATE: NORMAL
EXPIRATION DATE: NORMAL
FLUAV AG UPPER RESP QL IA.RAPID: NOT DETECTED
FLUBV AG UPPER RESP QL IA.RAPID: NOT DETECTED
INTERNAL CONTROL: NORMAL
INTERNAL CONTROL: NORMAL
Lab: NORMAL
Lab: NORMAL
S PYO AG THROAT QL: NEGATIVE
SARS-COV-2 AG UPPER RESP QL IA.RAPID: NOT DETECTED

## 2024-12-17 ENCOUNTER — READMISSION MANAGEMENT (OUTPATIENT)
Dept: CALL CENTER | Facility: HOSPITAL | Age: 34
End: 2024-12-17
Payer: COMMERCIAL

## 2024-12-17 NOTE — OUTREACH NOTE
Prep Survey      Flowsheet Row Responses   [facility] facility patient discharged from? Non-BH   Is LACE score < 7 ? Non-BH Discharge   Eligibility Not Eligible   What are the reasons patient is not eligible? Other  [Pregnancy]   Does the patient have one of the following disease processes/diagnoses(primary or secondary)? Other   Prep survey completed? Yes            Michelle LUQUE - Registered Nurse

## 2025-06-26 ENCOUNTER — OFFICE VISIT (OUTPATIENT)
Dept: FAMILY MEDICINE CLINIC | Facility: CLINIC | Age: 35
End: 2025-06-26
Payer: COMMERCIAL

## 2025-06-26 VITALS
HEART RATE: 54 BPM | TEMPERATURE: 97.7 F | HEIGHT: 67 IN | OXYGEN SATURATION: 100 % | BODY MASS INDEX: 22.19 KG/M2 | SYSTOLIC BLOOD PRESSURE: 108 MMHG | WEIGHT: 141.4 LBS | DIASTOLIC BLOOD PRESSURE: 64 MMHG

## 2025-06-26 DIAGNOSIS — J30.9 ALLERGIC RHINITIS, UNSPECIFIED SEASONALITY, UNSPECIFIED TRIGGER: ICD-10-CM

## 2025-06-26 DIAGNOSIS — K59.09 CHRONIC CONSTIPATION: Primary | ICD-10-CM

## 2025-06-26 DIAGNOSIS — K64.9 HEMORRHOIDS, UNSPECIFIED HEMORRHOID TYPE: ICD-10-CM

## 2025-06-26 DIAGNOSIS — R41.840 INATTENTION: ICD-10-CM

## 2025-06-26 DIAGNOSIS — R21 RASH: ICD-10-CM

## 2025-06-26 PROBLEM — J30.2 SEASONAL ALLERGIC RHINITIS: Status: RESOLVED | Noted: 2022-12-15 | Resolved: 2025-06-26

## 2025-06-26 RX ORDER — ERGOCALCIFEROL 1.25 MG/1
50000 CAPSULE ORAL WEEKLY
COMMUNITY
Start: 2025-02-03 | End: 2025-06-26

## 2025-06-26 RX ORDER — HYDROCORTISONE 25 MG/G
CREAM TOPICAL
Qty: 30 G | Refills: 0 | Status: SHIPPED | OUTPATIENT
Start: 2025-06-26

## 2025-06-26 NOTE — ASSESSMENT & PLAN NOTE
- Constipation has been an ongoing issue for a number of years, does not drink adequate amounts of water, which can be a contributing factor but she is taking laxatives on a daily basis and still has constipation as well as bleeding  - Referral placed to GI for further evaluation, patient would benefit from a colonoscopy

## 2025-06-26 NOTE — ASSESSMENT & PLAN NOTE
- Patient has struggled with inattention for a number of years, takes her long time to get tasks done, has trouble completing them, is a   - Referral placed to psychiatry for further evaluation and management

## 2025-06-26 NOTE — PROGRESS NOTES
Office Note     Name: Danny Hernandez    : 1990     MRN: [id number]     Chief Complaint  Establish Care and Eczema    Subjective     History of Present Illness:  Danny Hernandez is a 35 y.o. female who presents today for school concerns.  She is new to our clinic today.    Eczema: Patient reports that she has had some rough patches on her skin in both of her legs after her pregnancies.  She currently has a 4-year-old boy and a 6-month-old.  Intermittently, she has itchiness in these areas and scratches at it.  She has not applied any hydrocortisone cream.  She is willing to do this.      After her first pregnancy, she has been struggling with hemorrhoids.  It got better and then it got worse again after her second pregnancy.  She is on daily stool softeners.  She is usually taking a over-the-counter laxative capsule.  It is not helping.  She is trying to eat a healthy diet, mostly pescatarian/vegetarian.  She eats a lot of spinach, beans.  She has been having chronic constipation.  She does not have a bowel movement every day, usually every other day and it is hard.  She has blood in her stool often.  She has never tried anything other than Preparation H for her hemorrhoids.  She would like to try Anusol and would like a referral to GI.    Patient reports that she has had a lot of trouble focusing.  She feels that she takes a lot longer to do things and has trouble completing tasks.  She is a .  She would like to be evaluated for ADD.  She reports that she has had this issue for a long time but never had it addressed.  She feels that this is causing her anxiety because she cannot get anything done.    She is currently on Xyzal for allergies.  It is controlling her symptoms.    HM:   - Had a Pap smear earlier this year with [facility].  Would like to get her Pap smears moving forward with us        Objective     Past Medical History:   Diagnosis Date    History of Papanicolaou smear of  "cervix 04/2018    GYN- Midwife at      Past Surgical History:   Procedure Laterality Date    NO PAST SURGERIES       Family History   Problem Relation Age of Onset    No Known Problems Mother     No Known Problems Father     Asthma Sister     Thyroid disease Sister     Hashimoto's thyroiditis Sister     Brain cancer Brother     Cancer Brother     No Known Problems Maternal Grandmother     No Known Problems Maternal Grandfather     Dementia Paternal Grandmother     Pancreatic cancer Paternal Grandfather        Vital Signs  /64   Pulse 54   Temp 97.7 °F (36.5 °C) (Infrared)   Ht 170.2 cm (67\")   Wt 64.1 kg (141 lb 6.4 oz)   SpO2 100%   BMI 22.15 kg/m²   Estimated body mass index is 22.15 kg/m² as calculated from the following:    Height as of this encounter: 170.2 cm (67\").    Weight as of this encounter: 64.1 kg (141 lb 6.4 oz).    Physical Exam  Vitals reviewed. Exam conducted with a chaperone present.   Constitutional:       Appearance: Normal appearance.   HENT:      Head: Normocephalic.      Right Ear: External ear normal.      Left Ear: External ear normal.      Nose: Nose normal.      Mouth/Throat:      Mouth: Mucous membranes are moist.   Eyes:      Extraocular Movements: Extraocular movements intact.   Cardiovascular:      Rate and Rhythm: Normal rate and regular rhythm.      Heart sounds: Normal heart sounds.   Pulmonary:      Effort: Pulmonary effort is normal. No respiratory distress.      Breath sounds: Normal breath sounds.   Abdominal:      General: Abdomen is flat.      Palpations: Abdomen is soft.   Genitourinary:     Comments: Skin tag at 6 o'clock position of anus  Musculoskeletal:      Cervical back: No rigidity.      Comments: Moving all extremities spontaneously   Skin:     General: Skin is warm and dry.   Neurological:      General: No focal deficit present.      Mental Status: She is alert.   Psychiatric:         Mood and Affect: Mood normal.         Behavior: Behavior normal. "               Assessment and Plan     Diagnoses and all orders for this visit:    1. Chronic constipation (Primary)  Assessment & Plan:  - Constipation has been an ongoing issue for a number of years, does not drink adequate amounts of water, which can be a contributing factor but she is taking laxatives on a daily basis and still has constipation as well as bleeding  - Referral placed to GI for further evaluation, patient would benefit from a colonoscopy    Orders:  -     Ambulatory Referral to Gastroenterology    2. Hemorrhoids, unspecified hemorrhoid type  Assessment & Plan:  - Did a rectal exam and patient likely does have some internal hemorrhoids, does have a skin tag like lesion at the 6 o'clock position of the anus, which may be a hemorrhoid?  -Will start Anusol  - Advised patient to start MiraLAX on a daily basis and increase her water consumption to about 64 fluid ounces of water per day  - Referral placed to GI for further evaluation and management, may need a colorectal surgery referral eventually    Orders:  -     Hydrocortisone, Perianal, (ANUSOL-HC) 2.5 % rectal cream; Apply around rectum twice daily for 2 weks  Dispense: 30 g; Refill: 0  -     Ambulatory Referral to Gastroenterology    3. Rash  Assessment & Plan:  - Patient with a nonspecific rash on bilateral lower extremities, has some dry, rough patches, likely eczematous  - Recommend topical emollients and hydrocortisone cream for 1 to 2 week spans  - Return to clinic if no improvement or worsening of symptoms      4. Inattention  Assessment & Plan:  - Patient has struggled with inattention for a number of years, takes her long time to get tasks done, has trouble completing them, is a   - Referral placed to psychiatry for further evaluation and management    Orders:  -     Ambulatory Referral to Psychiatry    5. Allergic rhinitis, unspecified seasonality, unspecified trigger  Assessment & Plan:  - Controlled  - Continue  Xyzal        BMI is within normal parameters. No other follow-up for BMI required.    I spent 45 minutes caring for Danny on this date of service. This time includes time spent by me in the following activities:preparing for the visit, reviewing tests, obtaining and/or reviewing a separately obtained history, performing a medically appropriate examination and/or evaluation , and counseling and educating the patient/family/caregiver      Follow Up  Return in about 1 year (around 6/26/2026) for Annual.    Jannette Duckworth MD

## 2025-06-26 NOTE — ASSESSMENT & PLAN NOTE
- Patient with a nonspecific rash on bilateral lower extremities, has some dry, rough patches, likely eczematous  - Recommend topical emollients and hydrocortisone cream for 1 to 2 week spans  - Return to clinic if no improvement or worsening of symptoms

## 2025-06-26 NOTE — ASSESSMENT & PLAN NOTE
- Did a rectal exam and patient likely does have some internal hemorrhoids, does have a skin tag like lesion at the 6 o'clock position of the anus, which may be a hemorrhoid?  -Will start Anusol  - Advised patient to start MiraLAX on a daily basis and increase her water consumption to about 64 fluid ounces of water per day  - Referral placed to GI for further evaluation and management, may need a colorectal surgery referral eventually